# Patient Record
Sex: MALE | Race: OTHER | NOT HISPANIC OR LATINO | Employment: FULL TIME | URBAN - METROPOLITAN AREA
[De-identification: names, ages, dates, MRNs, and addresses within clinical notes are randomized per-mention and may not be internally consistent; named-entity substitution may affect disease eponyms.]

---

## 2021-08-26 ENCOUNTER — OFFICE VISIT (OUTPATIENT)
Dept: URGENT CARE | Facility: MEDICAL CENTER | Age: 21
End: 2021-08-26

## 2021-08-26 ENCOUNTER — APPOINTMENT (OUTPATIENT)
Dept: RADIOLOGY | Facility: MEDICAL CENTER | Age: 21
End: 2021-08-26

## 2021-08-26 VITALS
RESPIRATION RATE: 16 BRPM | SYSTOLIC BLOOD PRESSURE: 131 MMHG | HEIGHT: 72 IN | OXYGEN SATURATION: 98 % | BODY MASS INDEX: 20.32 KG/M2 | DIASTOLIC BLOOD PRESSURE: 70 MMHG | TEMPERATURE: 97.4 F | WEIGHT: 150 LBS | HEART RATE: 80 BPM

## 2021-08-26 DIAGNOSIS — S60.10XA SUBUNGUAL HEMATOMA OF DIGIT OF HAND, INITIAL ENCOUNTER: ICD-10-CM

## 2021-08-26 DIAGNOSIS — S62.665A CLOSED NONDISPLACED FRACTURE OF DISTAL PHALANX OF LEFT RING FINGER, INITIAL ENCOUNTER: Primary | ICD-10-CM

## 2021-08-26 DIAGNOSIS — S69.92XA INJURY OF FINGER OF LEFT HAND, INITIAL ENCOUNTER: ICD-10-CM

## 2021-08-26 PROCEDURE — G0383 LEV 4 HOSP TYPE B ED VISIT: HCPCS | Performed by: PHYSICIAN ASSISTANT

## 2021-08-26 PROCEDURE — 73140 X-RAY EXAM OF FINGER(S): CPT

## 2021-08-26 RX ORDER — IBUPROFEN 600 MG/1
600 TABLET ORAL EVERY 6 HOURS PRN
Qty: 30 TABLET | Refills: 0 | Status: SHIPPED | OUTPATIENT
Start: 2021-08-26

## 2021-08-26 RX ORDER — CEPHALEXIN 500 MG/1
500 CAPSULE ORAL EVERY 8 HOURS SCHEDULED
Qty: 30 CAPSULE | Refills: 0 | Status: SHIPPED | OUTPATIENT
Start: 2021-08-26 | End: 2021-09-05

## 2021-08-26 NOTE — PROGRESS NOTES
[id number]        NAME: Christina Guevara is a 24 y o  male  : 2000    MRN: [id number]  DATE: 2021  TIME: 1:25 PM    /70   Pulse 80   Temp (!) 97 4 °F (36 3 °C)   Resp 16   Ht 6' (1 829 m)   Wt 68 kg (150 lb)   SpO2 98%   BMI 20 34 kg/m²     Assessment and Plan   Closed nondisplaced fracture of distal phalanx of left ring finger, initial encounter [G69 [id number]]  1  Closed nondisplaced fracture of distal phalanx of left ring finger, initial encounter  XR finger left fourth digit-ring    ibuprofen (MOTRIN) 600 mg tablet    cephalexin (KEFLEX) 500 mg capsule   2  Subungual hematoma of digit of hand, initial encounter  ibuprofen (MOTRIN) 600 mg tablet    cephalexin (KEFLEX) 500 mg capsule         Patient Instructions       Follow up with PCP in 3-5 days  Proceed to  ER if symptoms worsen  Chief Complaint     Chief Complaint   Patient presents with    Finger Injury     Pt states that around 1100 he hit his left hand 4 digit with a hammer while at work  Pt is c/o 6/10 pain and swelling  Pt is self employed, so this is not an occmed injury  History of Present Illness       Pt with left 4th finger being hit with hammer at about 10am today       Review of Systems   Review of Systems   Constitutional: Negative  HENT: Negative  Eyes: Negative  Respiratory: Negative  Cardiovascular: Negative  Gastrointestinal: Negative  Endocrine: Negative  Genitourinary: Negative  Musculoskeletal: Negative  Skin: Negative  Allergic/Immunologic: Negative  Neurological: Negative  Hematological: Negative  Psychiatric/Behavioral: Negative  All other systems reviewed and are negative          Current Medications       Current Outpatient Medications:     cephalexin (KEFLEX) 500 mg capsule, Take 1 capsule (500 mg total) by mouth every 8 (eight) hours for 10 days, Disp: 30 capsule, Rfl: 0    ibuprofen (MOTRIN) 600 mg tablet, Take 1 tablet (600 mg total) by mouth every 6 (six) hours as needed for mild pain, Disp: 30 tablet, Rfl: 0    Current Allergies     Allergies as of 08/26/2021    (No Known Allergies)            The following portions of the patient's history were reviewed and updated as appropriate: allergies, current medications, past family history, past medical history, past social history, past surgical history and problem list      History reviewed  No pertinent past medical history  History reviewed  No pertinent surgical history  History reviewed  No pertinent family history  Medications have been verified  Objective   /70   Pulse 80   Temp (!) 97 4 °F (36 3 °C)   Resp 16   Ht 6' (1 829 m)   Wt 68 kg (150 lb)   SpO2 98%   BMI 20 34 kg/m²          Physical Exam     Physical Exam  Vitals and nursing note reviewed  Constitutional:       Appearance: Normal appearance  He is normal weight  HENT:      Head: Normocephalic and atraumatic  Right Ear: Tympanic membrane, ear canal and external ear normal       Left Ear: Tympanic membrane, ear canal and external ear normal       Nose: Nose normal       Mouth/Throat:      Mouth: Mucous membranes are moist       Pharynx: Oropharynx is clear  Eyes:      Extraocular Movements: Extraocular movements intact  Conjunctiva/sclera: Conjunctivae normal       Pupils: Pupils are equal, round, and reactive to light  Cardiovascular:      Rate and Rhythm: Normal rate and regular rhythm  Pulses: Normal pulses  Heart sounds: Normal heart sounds  Pulmonary:      Effort: Pulmonary effort is normal       Breath sounds: Normal breath sounds  Abdominal:      General: Abdomen is flat  Bowel sounds are normal       Palpations: Abdomen is soft  Musculoskeletal:         General: Normal range of motion  Cervical back: Normal range of motion and neck supple        Comments: Left 4th finger distal phalanx tender and swelling from with pain  Pad swollen  Small subungual hematoma  Finger tip sensation wnl    Skin:     General: Skin is warm  Capillary Refill: Capillary refill takes less than 2 seconds  Neurological:      General: No focal deficit present  Mental Status: He is alert and oriented to person, place, and time  Psychiatric:         Mood and Affect: Mood normal          Behavior: Behavior normal              Splint application    Date/Time: 8/26/2021 1:24 PM  Performed by: Frida Josue PA-C  Authorized by: Frida Josue PA-C   Universal Protocol:  Consent: Verbal consent obtained  Written consent not obtained  Consent given by: patient  Patient identity confirmed: verbally with patient      Pre-procedure details:     Sensation:  Normal  Procedure details:     Laterality:  Left    Location:  Finger    Finger:  L ring fingerCast type:  Finger      Splint type:  Finger splint, static    Supplies:  Aluminum splint  Post-procedure details:     Pain:  Improved    Sensation:  Normal    Patient tolerance of procedure:   Tolerated well, no immediate complications

## 2021-08-26 NOTE — PATIENT INSTRUCTIONS
Finger Fracture   WHAT YOU NEED TO KNOW:   A finger fracture is a break in one or more of the bones in your finger  DISCHARGE INSTRUCTIONS:   Return to the emergency department if:   · Your cast or splint gets wet, damaged, or comes off  · Your splint or cast feels too tight  · You have severe pain  · Your injured finger is numb, cold, or pale  Call your doctor or hand specialist if:   · Your pain or swelling gets worse, even after treatment  · You have questions or concerns about your condition or care  Medicines: You may need any of the following:  · NSAIDs , such as ibuprofen, help decrease swelling, pain, and fever  This medicine is available with or without a doctor's order  NSAIDs can cause stomach bleeding or kidney problems in certain people  If you take blood thinner medicine, always ask your healthcare provider if NSAIDs are safe for you  Always read the medicine label and follow directions  · Acetaminophen  decreases pain and fever  It is available without a doctor's order  Ask how much to take and how often to take it  Follow directions  Read the labels of all other medicines you are using to see if they also contain acetaminophen, or ask your doctor or pharmacist  Acetaminophen can cause liver damage if not taken correctly  Do not use more than 4 grams (4,000 milligrams) total of acetaminophen in one day  · Prescription pain medicine  may be given  Ask your healthcare provider how to take this medicine safely  Some prescription pain medicines contain acetaminophen  Do not take other medicines that contain acetaminophen without talking to your healthcare provider  Too much acetaminophen may cause liver damage  Prescription pain medicine may cause constipation  Ask your healthcare provider how to prevent or treat constipation  · Take your medicine as directed  Contact your healthcare provider if you think your medicine is not helping or if you have side effects   Tell him or her if you are allergic to any medicine  Keep a list of the medicines, vitamins, and herbs you take  Include the amounts, and when and why you take them  Bring the list or the pill bottles to follow-up visits  Carry your medicine list with you in case of an emergency  Self-care:   · Wear your splint as directed  Do not remove your splint until you follow up with your healthcare provider or hand specialist     · Apply ice  on your finger for 15 to 20 minutes every hour or as directed  Use an ice pack, or put crushed ice in a plastic bag  Cover it with a towel before you apply it to your skin  Ice helps prevent tissue damage and decreases swelling and pain  · Elevate  your finger above the level of your heart as often as you can  This will help decrease swelling and pain  Prop your hand on pillows or blankets to keep it elevated comfortably  · Go to physical therapy as directed  A physical therapist teaches you exercises to help improve movement and strength, and to decrease pain  Follow up with your doctor or hand specialist within 2 days:  Write down your questions so you remember to ask them during your visits  © Copyright [employer] 2021 Information is for End User's use only and may not be sold, redistributed or otherwise used for commercial purposes  All illustrations and images included in CareNotes® are the copyrighted property of [employer]  or [employer]  The above information is an  only  It is not intended as medical advice for individual conditions or treatments  Talk to your doctor, nurse or pharmacist before following any medical regimen to see if it is safe and effective for you

## 2021-08-27 NOTE — TELEPHONE ENCOUNTER
Tried to call no voicemail set up  The appointment on 8/30 with Alberto Lagunas needs to be moved to the following day

## 2021-09-04 ENCOUNTER — TELEPHONE (OUTPATIENT)
Dept: OTHER | Facility: OTHER | Age: 21
End: 2021-09-04

## 2021-09-04 ENCOUNTER — OFFICE VISIT (OUTPATIENT)
Dept: OBGYN CLINIC | Facility: HOSPITAL | Age: 21
End: 2021-09-04
Payer: COMMERCIAL

## 2021-09-04 VITALS
SYSTOLIC BLOOD PRESSURE: 108 MMHG | WEIGHT: 154.8 LBS | DIASTOLIC BLOOD PRESSURE: 69 MMHG | BODY MASS INDEX: 20.97 KG/M2 | HEIGHT: 72 IN | HEART RATE: 64 BPM

## 2021-09-04 DIAGNOSIS — M79.645 PAIN OF FINGER OF LEFT HAND: Primary | ICD-10-CM

## 2021-09-04 DIAGNOSIS — S62.635A CLOSED DISPLACED FRACTURE OF DISTAL PHALANX OF LEFT RING FINGER, INITIAL ENCOUNTER: ICD-10-CM

## 2021-09-04 PROCEDURE — 99203 OFFICE O/P NEW LOW 30 MIN: CPT | Performed by: PHYSICIAN ASSISTANT

## 2021-09-04 NOTE — TELEPHONE ENCOUNTER
Caller is calling to inform the office he will be late for his appointment, I asked if it will be later than 10 minutes: YES      I did inform her the arrival time was 0745, caller wanted to reschedule, however I was unable to accomodate her request, they will be late to the appointment:

## 2021-09-04 NOTE — PROGRESS NOTES
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 and his left ring finger got caught in between an object  He felt immediate pain and swelling  He denies any pain proximally the extremity  There is no open wound or bleeding  He presented to an urgent care where he was diagnosed with finger fracture and is referred here for further evaluation  Today he states the pain is much better than last week  His fingernail is swollen with bruising  He denies any associated fever or chills  He has not been immobilized  The following portions of the patient's history were reviewed and updated as appropriate: allergies, current medications, past family history, past medical history, past social history, past surgical history and problem list     Review of Systems     Objective:    Imaging:  Left hand x-rays:    IMPRESSION:     Comminuted and slightly displaced fourth distal tuft fracture  Vitals:    09/04/21 0820   BP: 108/69   Pulse: 64           Physical Exam     Left hand:    Left ring finger:   inspection reveals left distal phalanx demonstrates subungual hematoma with fingernail intact  There is no open wound        it is mildly tender to palpation with sensation intact   Limited range of motion at the D IP secondary to swelling   The remaining phalanx does not demonstrate any swelling or tenderness to palpation  He has full range of motion at the PIP joint and MCP     all digits are warm and well perfused with good capillary refill   Wrist range of motion is normal without any tenderness to palpation over the carpal row